# Patient Record
Sex: FEMALE | Race: WHITE | Employment: OTHER | URBAN - METROPOLITAN AREA
[De-identification: names, ages, dates, MRNs, and addresses within clinical notes are randomized per-mention and may not be internally consistent; named-entity substitution may affect disease eponyms.]

---

## 2017-07-17 PROBLEM — Z79.899 ENCOUNTER FOR MEDICATION MANAGEMENT: Status: ACTIVE | Noted: 2017-07-17

## 2017-07-17 PROBLEM — G43.119 INTRACTABLE MIGRAINE WITH AURA WITHOUT STATUS MIGRAINOSUS: Status: ACTIVE | Noted: 2017-07-17

## 2017-07-17 PROBLEM — R41.82 ALTERED MENTAL STATUS, UNSPECIFIED: Status: ACTIVE | Noted: 2017-07-17

## 2017-07-26 ENCOUNTER — HOSPITAL ENCOUNTER (OUTPATIENT)
Dept: MRI IMAGING | Age: 27
Discharge: HOME | End: 2017-07-26
Attending: PSYCHIATRY & NEUROLOGY
Payer: COMMERCIAL

## 2017-07-26 DIAGNOSIS — R41.82 ALTERED MENTAL STATUS, UNSPECIFIED: ICD-10-CM

## 2017-07-26 PROCEDURE — 70551 MRI BRAIN STEM W/O DYE: CPT

## 2017-10-18 PROBLEM — G43.119 INTRACTABLE MIGRAINE WITH AURA WITHOUT STATUS MIGRAINOSUS: Status: RESOLVED | Noted: 2017-07-17 | Resolved: 2017-10-18

## 2017-10-18 PROBLEM — G43.109 MIGRAINE WITH AURA AND WITHOUT STATUS MIGRAINOSUS, NOT INTRACTABLE: Status: ACTIVE | Noted: 2017-10-18

## 2017-10-21 PROBLEM — R90.89 ABNORMAL FINDING ON MRI OF BRAIN: Status: ACTIVE | Noted: 2017-10-21

## 2019-01-08 PROBLEM — G90.A POTS (POSTURAL ORTHOSTATIC TACHYCARDIA SYNDROME): Status: ACTIVE | Noted: 2019-01-08

## 2019-01-08 PROBLEM — R55 SYNCOPE: Status: ACTIVE | Noted: 2019-01-08

## 2021-03-08 PROBLEM — R90.89 ABNORMAL FINDING ON MRI OF BRAIN: Status: RESOLVED | Noted: 2017-10-21 | Resolved: 2021-03-08

## 2021-03-08 PROBLEM — R55 SYNCOPE: Status: RESOLVED | Noted: 2019-01-08 | Resolved: 2021-03-08

## 2021-03-08 PROBLEM — R41.82 ALTERED MENTAL STATUS, UNSPECIFIED: Status: RESOLVED | Noted: 2017-07-17 | Resolved: 2021-03-08

## 2021-03-08 PROBLEM — Z79.899 ENCOUNTER FOR MEDICATION MANAGEMENT: Status: RESOLVED | Noted: 2017-07-17 | Resolved: 2021-03-08

## 2021-03-08 PROBLEM — G90.A POTS (POSTURAL ORTHOSTATIC TACHYCARDIA SYNDROME): Status: RESOLVED | Noted: 2019-01-08 | Resolved: 2021-03-08

## 2022-03-19 PROBLEM — G90.A POTS (POSTURAL ORTHOSTATIC TACHYCARDIA SYNDROME): Status: ACTIVE | Noted: 2019-01-08

## 2022-03-19 PROBLEM — G43.109 MIGRAINE WITH AURA AND WITHOUT STATUS MIGRAINOSUS, NOT INTRACTABLE: Status: ACTIVE | Noted: 2017-10-18

## 2022-06-06 ENCOUNTER — TELEPHONE (OUTPATIENT)
Dept: CARDIOLOGY CLINIC | Age: 32
End: 2022-06-06

## 2022-06-06 NOTE — TELEPHONE ENCOUNTER
Pt called stating she was told Dr. Ritchie Alexander would send a referral to internal medicine of her choice. She is scheduled to see Dr. Georgia uKmari at [facility] and would like to have the referral sent to them.      [phone]

## 2022-06-07 NOTE — TELEPHONE ENCOUNTER
Called [facility], pt scheduled June 28th. They state that they do not need a referral for pt, and should be able to see records through epic.

## 2022-06-14 ENCOUNTER — OFFICE VISIT (OUTPATIENT)
Dept: CARDIOLOGY CLINIC | Age: 32
End: 2022-06-14
Payer: COMMERCIAL

## 2022-06-14 VITALS
HEIGHT: 65 IN | SYSTOLIC BLOOD PRESSURE: 110 MMHG | DIASTOLIC BLOOD PRESSURE: 64 MMHG | WEIGHT: 114.7 LBS | BODY MASS INDEX: 19.11 KG/M2 | HEART RATE: 72 BPM

## 2022-06-14 DIAGNOSIS — G90.A POTS (POSTURAL ORTHOSTATIC TACHYCARDIA SYNDROME): Primary | ICD-10-CM

## 2022-06-14 PROCEDURE — 93000 ELECTROCARDIOGRAM COMPLETE: CPT | Performed by: INTERNAL MEDICINE

## 2022-06-14 PROCEDURE — 99214 OFFICE O/P EST MOD 30 MIN: CPT | Performed by: INTERNAL MEDICINE

## 2022-06-14 ASSESSMENT — ENCOUNTER SYMPTOMS
ALLERGIC/IMMUNOLOGIC NEGATIVE: 1
GASTROINTESTINAL NEGATIVE: 1
EYES NEGATIVE: 1
RESPIRATORY NEGATIVE: 1

## 2022-06-14 NOTE — PROGRESS NOTES
[facility] CARDIOLOGY  [address] [facility], [address]  PHONE: [phone]        22    NAME:  Peter Bradley  : 1990  MRN: [id number]     Referring Physician: Dot Madsen MD, [place], [place]     Reason for Consultation: POTS       ASSESSMENT and PLAN:   Diagnoses and all orders for this visit:      1. Encounter to establish care with new doctor      2. Migraine with aura and without status migrainosus, not intractable      3. POTS (postural orthostatic tachycardia syndrome)      4. MVP      33year old female with a possible diagnosis of POTS which appears to be better controlled than she initially was in 2017. She has been coreg which has helped but she is also on digoxin which is not an ideal medicine for her given age and the potential  to cause further arrhythmia. For now, I would like to further evaluate for a potential SVT with a monitor as well as her response to exercise with a stress echo. We may need to consider a SRINIVASA in follow up for a formal evaluation for POTS. I encouraged  use of Gatorade especially during the warmer months. In follow up, doing much better. Identified food triggers which has helped.       -Echo stress and monitor, reviewed, stable. -Reviewed OSH records.    -Continue beta blocker, off digoxin.   -Routine PCP follow up.    -Increase salt intake and use of gatorade especially in summer. -EP follow up in 6 months or PRN. Patient has been instructed and agrees to call our office with any issues or other concerns related to their cardiac condition(s) and/or complaint(s). No follow-up provider specified. Thank you for allowing me to participate in the electrophysiologic care of Ms. Peter Bradley. Please contact me if any questions or concerns were to arise. Rosa Albrgiht.  Felicia ORR, 5 [facility] Cardiac Electrophysiology  [facility] Cardiology  22  10:53 AM    ===================================================================  Chief Complant:    Chief Complaint   Patient presents with    Follow-up     POTS           Consultation is requested by No ref. provider found for evaluation of Follow-up (POTS   )      History:  José Miguel Perez is a most pleasant 33 y.o. female with a past medical and cardiac history significant for palpitations. She comes in for followup. She has formerly been followed  by Dr. Benjy Slaughter in [facility]. Her workup included an echo which was significant for possible MVP by echo. She has been treated with metoprolol and atenolol which did not help her. She is now on coreg and digoxin. She has also been told she has  POTS. She does describe her HR going in the 160s in 2017 which woke her up from sleep. She was told to increase salt/gatorade at that time. She did have symptoms that included significant palpitations and difficulty standing which was a difficult experience  for her. Her symptoms are much more controlled these days but she still has events that occur at times. She otherwise is compliant with medicine therapy. Her daughter, Anson Li, joined her in the office today. She is 12 and is going into 7th grade. Her mother,  Caitlyn Miranda is going to see me in the office soon. She comes in follow up. She's generally been doing well. She has had a few presyncopal episodes, but she feels as though she's been doing well compared to previously. The patient otherwise denies chest pain, dyspnea, syncope or lateralizing symptoms. Cardiac PMH: (Old records have been reviewed and summarized below)      EKG:  (EKG has been independently visualized by me with interpretation below): NSR, normal axis, no ischemia. No evidence for pre-excitation,  QTc prolongation or Brugada pattern. Monitor: 8/2021, 14 days, WNL       ECHO: 10/2019, EF WNL, borderline LVH, MVP?  Mild MR, RVSP 19 mmHg       ECHO stress:  ·   Echo: Normal stress echocardiogram. Low risk study. 10.4 METs     Stress Findings        Stress ECG  Baseline ECG: Normal sinus rhythm, non-specific ST-T wave abnormalities. There were no arrhythmias during stress. There was no ST  segment deviation noted during stress. There were no arrhythmias during recovery. Stress Test  A stress test was performed following the Ned protocol. Increased incline only in Stage IV. The test was stopped because the patient  complained of fatigue and shortness of breath. The patient reported no angina during stress. Pt denied c/o chest pain. Blood pressure demonstrated a normal response to exercise. Angina Index is 0. The Duke Treadmill score is 9 (low  risk). Last dose of Coreg was 9/15/21 in the afternoon. Previous Heart Catheterization: n/a      Stress Test: 7/2020, low risk nuclear stress test.       DEVICE INTERROGATION: n/a     Past Medical History, Past Surgical History, Family history, Social History, and Medications were all reviewed with the patient today and updated as necessary. Current Outpatient Medications   Medication Sig Dispense Refill    Fluticasone-Salmeterol (ADVAIR DISKUS IN) Inhale into the lungs      carvedilol (COREG CR) 10 MG CP24 extended release capsule Take 10 mg by mouth daily (with breakfast)      Cetirizine HCl (ZYRTEC ALLERGY) 10 MG TBDP 1 tablet       No current facility-administered medications for this visit.      Allergies   Allergen Reactions    Amoxicillin-Pot Clavulanate Hives    Cephalexin Diarrhea    Codeine Anaphylaxis    Meperidine Anaphylaxis    Clindamycin Diarrhea    Gentamicin Diarrhea    Azithromycin Other (See Comments)       Social History     Tobacco Use    Smoking status: Never Smoker    Smokeless tobacco: Never Used   Substance Use Topics    Alcohol use: Not Currently       ROS:  A comprehensive review of systems was performed with the pertinent positives and negatives as noted in the HPI in addition to:  Review of Systems   Constitutional: Negative. HENT: Negative. Eyes: Negative. Respiratory: Negative. Cardiovascular: Negative. Gastrointestinal: Negative. Endocrine: Negative. Genitourinary: Negative. Musculoskeletal: Negative. Skin: Negative. Allergic/Immunologic: Negative. Neurological: Negative. Hematological: Negative. Psychiatric/Behavioral: Negative. All other systems reviewed and are negative. PHYSICAL EXAM:   /64   Pulse 72   Ht 5' 5\" (1.651 m)   Wt 114 lb 11.2 oz (52 kg) Comment: with shoes  BMI 19.09 kg/m²      Wt Readings from Last 3 Encounters:   06/14/22 114 lb 11.2 oz (52 kg)   12/14/21 118 lb (53.5 kg)   09/16/21 112 lb (50.8 kg)     BP Readings from Last 3 Encounters:   06/14/22 110/64   12/14/21 102/62   09/16/21 110/60       Gen: Well appearing, well developed, no acute distress  Eyes: Pupils equal, round. Extraocular movements are intact  ENT: Oropharynx clear, no oral lesions, normal dentition  CV: S1S2, regular rate and rhythm, no murmurs, rubs or gallops, normal JVD, no carotid bruits, normal distal pulses, no LETICIA  Pulm: Clear to auscultation bilaterally, no accessory muscle uses, no wheezes or rales  GI: Soft, NT, ND, +BS  Neuro: Alert and oriented, nonfocal  Psych: Appropriate affect  Skin: Normal color and skin turgor  MSK: Normal muscle bulk and tone    Medical problems and test results were reviewed with the patient today. No results found for any visits on 06/14/22.

## 2022-09-13 NOTE — TELEPHONE ENCOUNTER
MEDICATION REFILL REQUEST      Name of Medication:  Carvedilol  Dose:  10 mg  Frequency:  1 a day  Quantity:  90  Days' supply:  90      Pharmacy Name/Location:  no pharmacy info left on vm  Please call pt to see which pharmacy

## 2022-09-13 NOTE — TELEPHONE ENCOUNTER
Spoke to pt call in prescription to [facility] Prescriptions     Pending Prescriptions Disp Refills    carvedilol (COREG CR) 10 MG CP24 extended release capsule 90 capsule 3     Sig: Take 1 capsule by mouth daily (with breakfast)        Refill request sent to provider for approval

## 2022-09-14 ENCOUNTER — TELEPHONE (OUTPATIENT)
Dept: CARDIOLOGY CLINIC | Age: 32
End: 2022-09-14

## 2022-09-14 RX ORDER — CARVEDILOL PHOSPHATE 10 MG/1
10 CAPSULE, EXTENDED RELEASE ORAL
Qty: 90 CAPSULE | Refills: 3 | Status: SHIPPED | OUTPATIENT
Start: 2022-09-14

## 2022-09-14 NOTE — TELEPHONE ENCOUNTER
Patient called to check on prescription left yesterday Carvedilol 10mg.  Patient said she needs it to go to [address] [place] [address]

## 2022-12-27 ENCOUNTER — OFFICE VISIT (OUTPATIENT)
Dept: CARDIOLOGY CLINIC | Age: 32
End: 2022-12-27
Payer: COMMERCIAL

## 2022-12-27 VITALS
WEIGHT: 117 LBS | DIASTOLIC BLOOD PRESSURE: 64 MMHG | HEART RATE: 61 BPM | BODY MASS INDEX: 19.49 KG/M2 | SYSTOLIC BLOOD PRESSURE: 110 MMHG | HEIGHT: 65 IN

## 2022-12-27 DIAGNOSIS — G90.A POTS (POSTURAL ORTHOSTATIC TACHYCARDIA SYNDROME): Primary | ICD-10-CM

## 2022-12-27 PROCEDURE — 93000 ELECTROCARDIOGRAM COMPLETE: CPT | Performed by: INTERNAL MEDICINE

## 2022-12-27 PROCEDURE — 99214 OFFICE O/P EST MOD 30 MIN: CPT | Performed by: INTERNAL MEDICINE

## 2022-12-27 RX ORDER — TRETINOIN 0.5 MG/G
CREAM TOPICAL
COMMUNITY

## 2022-12-27 ASSESSMENT — ENCOUNTER SYMPTOMS
GASTROINTESTINAL NEGATIVE: 1
RESPIRATORY NEGATIVE: 1
ALLERGIC/IMMUNOLOGIC NEGATIVE: 1
EYES NEGATIVE: 1

## 2022-12-27 NOTE — PROGRESS NOTES
[facility] CARDIOLOGY  [address] [facility], [address]  Silvina Avilez, [address] [place] [address]  PHONE: [phone]        22    NAME:  Antolin Joya  : 1990  MRN: [id number]     Referring Physician: Leonel Mckeon MD, [place], [place]     Reason for Consultation: POTS       ASSESSMENT and PLAN:   Diagnoses and all orders for this visit:      1. Encounter to establish care with new doctor      2. Migraine with aura and without status migrainosus, not intractable      3. POTS (postural orthostatic tachycardia syndrome)      4. MVP      27year old female with a possible diagnosis of POTS which appears to be better controlled than she initially was in 2017. She has been coreg which has helped but she is also on digoxin which is not an ideal medicine for her given age and the potential  to cause further arrhythmia. For now, I would like to further evaluate for a potential SVT with a monitor as well as her response to exercise with a stress echo. We may need to consider a SRINIVASA in follow up for a formal evaluation for POTS. I encouraged use of Gatorade especially during the warmer months. In follow up, doing much better. Identified food triggers which has helped. This continues in today's evaluation. -POTS - Echo stress and monitor, reviewed, stable. -Reviewed OSH records.    -Continue beta blocker, off digoxin (feels much better after stopping this). -Routine PCP follow up.    -Increase salt intake and use of gatorade especially in summer. -EP follow up in 6 months or PRN. Patient has been instructed and agrees to call our office with any issues or other concerns related to their cardiac condition(s) and/or complaint(s). No follow-up provider specified. Thank you for allowing me to participate in the electrophysiologic care of Ms. Antolin Joya. Please contact me if any questions or concerns were to arise. Marcus Fajardo.  Felicia ORR, MS  Clinical Cardiac Electrophysiology  [facility] Cardiology  12/27/22  4:03 PM    ===================================================================  Chief Complant:    Chief Complaint   Patient presents with    Irregular Heart Beat    Other     POTS        Consultation is requested by Luciana Laboy MD for evaluation of Irregular Heart Beat and Other (POTS)      History:  Tony Pereira is a most pleasant 27 y.o. female with a past medical and cardiac history significant for palpitations. She comes in for followup. She has formerly been followed  by Dr. Collin Perry in [facility]. Her workup included an echo which was significant for possible MVP by echo. She has been treated with metoprolol and atenolol which did not help her. She is now on coreg and digoxin. She has also been told she has  POTS. She does describe her HR going in the 160s in 2017 which woke her up from sleep. She was told to increase salt/gatorade at that time. She did have symptoms that included significant palpitations and difficulty standing which was a difficult experience  for her. Her symptoms are much more controlled these days but she still has events that occur at times. She otherwise is compliant with medicine therapy. Her daughter, Andrés Hernández, joined her in the office today. She is 12 and is going into 7th grade. Her mother,  Mike Hitchcock is going to see me in the office soon. She comes in follow up. She's generally been doing well. She reports one episode where her heart rate increased to the 140s-150s after eating a meal but doing well since then. The patient otherwise denies chest pain, dyspnea, syncope or lateralizing symptoms. Cardiac PMH: (Old records have been reviewed and summarized below)      EKG:  (EKG has been independently visualized by me with interpretation below): NSR, normal axis, no ischemia. No evidence for pre-excitation,  QTc prolongation or Brugada pattern. Monitor: 8/2021, 14 days, WNL       ECHO: 10/2019, EF WNL, borderline LVH, MVP? Mild MR, RVSP 19 mmHg       ECHO stress:    Echo: Normal stress echocardiogram. Low risk study. 10.4 METs     Stress Findings        Stress ECG  Baseline ECG: Normal sinus rhythm, non-specific ST-T wave abnormalities. There were no arrhythmias during stress. There was no ST  segment deviation noted during stress. There were no arrhythmias during recovery. Stress Test  A stress test was performed following the Ned protocol. Increased incline only in Stage IV. The test was stopped because the patient  complained of fatigue and shortness of breath. The patient reported no angina during stress. Pt denied c/o chest pain. Blood pressure demonstrated a normal response to exercise. Angina Index is 0. The Duke Treadmill score is 9 (low  risk). Last dose of Coreg was 9/15/21 in the afternoon. Previous Heart Catheterization: n/a      Stress Test: 7/2020, low risk nuclear stress test.       DEVICE INTERROGATION: n/a     Past Medical History, Past Surgical History, Family history, Social History, and Medications were all reviewed with the patient today and updated as necessary. Current Outpatient Medications   Medication Sig Dispense Refill    tretinoin (RETIN-A) 5.93 % cream 1 application in the evening to face      Multiple Vitamin (MULTI-VITAMIN DAILY PO) Take by mouth      carvedilol (COREG CR) 10 MG CP24 extended release capsule Take 1 capsule by mouth daily (with breakfast) 90 capsule 3     No current facility-administered medications for this visit. Allergies   Allergen Reactions    Amoxicillin-Pot Clavulanate Hives     Other reaction(s): hives, Hives/Swelling-Allergy, Tachycardia-Intolerance    Cephalexin Diarrhea     Other reaction(s):  Other (See Comments), Other (See Comments), Unknown  colitis  colitis      Codeine Anaphylaxis and Shortness Of Breath     Other reaction(s): Unknown    Doxycycline      Other reaction(s): Headache-Intolerance    Meperidine Anaphylaxis and Shortness Of Breath    Meperidine Hcl      Other reaction(s): Unknown    Clindamycin Diarrhea and Nausea And Vomiting     Other reaction(s): GI intolerance, Nausea and/or vomiting-Intolerance, Other (See Comments), Other (See Comments)  colitis  colitis      Gentamicin Diarrhea    Hydrocodone-Acetaminophen      Other reaction(s): Other (See Comments)    Methylergonovine      Other reaction(s): Other (See Comments)    Azithromycin Other (See Comments) and Palpitations     Other reaction(s): Tachycardia-Intolerance       Social History     Tobacco Use    Smoking status: Never    Smokeless tobacco: Never   Substance Use Topics    Alcohol use: Not Currently       ROS:  A comprehensive review of systems was performed with the pertinent positives and negatives as noted in the HPI in addition to:  Review of Systems   Constitutional: Negative. HENT: Negative. Eyes: Negative. Respiratory: Negative. Cardiovascular: Negative. Gastrointestinal: Negative. Endocrine: Negative. Genitourinary: Negative. Musculoskeletal: Negative. Skin: Negative. Allergic/Immunologic: Negative. Neurological: Negative. Hematological: Negative. Psychiatric/Behavioral: Negative. All other systems reviewed and are negative. PHYSICAL EXAM:   /64   Pulse 61   Ht 5' 5\" (1.651 m)   Wt 117 lb (53.1 kg)   BMI 19.47 kg/m²      Wt Readings from Last 3 Encounters:   12/27/22 117 lb (53.1 kg)   06/14/22 114 lb 11.2 oz (52 kg)   12/14/21 118 lb (53.5 kg)     BP Readings from Last 3 Encounters:   12/27/22 110/64   06/14/22 110/64   12/14/21 102/62     Gen: Well appearing, well developed, no acute distress  Eyes: Pupils equal, round.  Extraocular movements are intact  ENT: Oropharynx clear, no oral lesions, normal dentition  CV: S1S2, regular rate and rhythm, no murmurs, rubs or gallops, normal JVD, no carotid bruits, normal distal pulses, no LETICIA  Pulm: Clear to auscultation bilaterally, no accessory muscle uses, no wheezes or rales  GI: Soft, NT, ND, +BS  Neuro: Alert and oriented, nonfocal  Psych: Appropriate affect  Skin: Normal color and skin turgor  MSK: Normal muscle bulk and tone    Medical problems and test results were reviewed with the patient today. No results found for any visits on 12/27/22.

## 2023-06-23 ENCOUNTER — OFFICE VISIT (OUTPATIENT)
Age: 33
End: 2023-06-23
Payer: COMMERCIAL

## 2023-06-23 VITALS
SYSTOLIC BLOOD PRESSURE: 110 MMHG | DIASTOLIC BLOOD PRESSURE: 62 MMHG | BODY MASS INDEX: 18.99 KG/M2 | WEIGHT: 114 LBS | HEIGHT: 65 IN | HEART RATE: 66 BPM

## 2023-06-23 DIAGNOSIS — G90.A POTS (POSTURAL ORTHOSTATIC TACHYCARDIA SYNDROME): Primary | ICD-10-CM

## 2023-06-23 PROCEDURE — 99214 OFFICE O/P EST MOD 30 MIN: CPT | Performed by: INTERNAL MEDICINE

## 2023-06-23 PROCEDURE — 93000 ELECTROCARDIOGRAM COMPLETE: CPT | Performed by: INTERNAL MEDICINE

## 2023-06-23 RX ORDER — CARVEDILOL PHOSPHATE 10 MG/1
10 CAPSULE, EXTENDED RELEASE ORAL
Qty: 90 CAPSULE | Refills: 3 | Status: SHIPPED | OUTPATIENT
Start: 2023-06-23

## 2023-06-23 NOTE — PROGRESS NOTES
accessory muscle uses, no wheezes or rales  GI: Soft, NT, ND, +BS  Neuro: Alert and oriented, nonfocal  Psych: Appropriate affect  Skin: Normal color and skin turgor  MSK: Normal muscle bulk and tone    Medical problems and test results were reviewed with the patient today. No results found for any visits on 06/23/23.

## 2023-07-03 ENCOUNTER — TELEPHONE (OUTPATIENT)
Age: 33
End: 2023-07-03

## 2023-07-03 DIAGNOSIS — I49.8 OTHER SPECIFIED CARDIAC ARRHYTHMIAS: Primary | ICD-10-CM

## 2023-07-03 NOTE — TELEPHONE ENCOUNTER
Pt states Dr. Bryanna Oconnor told her that she doesn't have mitral valve issues but another doctor told her that she does. Pt would like to have an echo performed to rule out issues with mitral valve.

## 2023-08-03 ENCOUNTER — TELEPHONE (OUTPATIENT)
Age: 33
End: 2023-08-03

## 2023-08-03 NOTE — TELEPHONE ENCOUNTER
Pt states that she went to urgent care and hey said she has a UTI that is strep group B and that they call her in some Nitrofurantoin and it caused her to have heart palpitations. So they prescribed her fosfomycin one time power. Wants to know if that's ok to take. Also she has had colitis using keflex and clindamycin Wants to know what she needs to do.

## 2023-08-03 NOTE — TELEPHONE ENCOUNTER
Spoke with pt made her aware per Dr Pattie Gunderson since she is allergic to most meds so she'll have to probably deal with some palps until she gets it treated. Pt verbalized understanding.

## 2023-11-15 ENCOUNTER — TELEPHONE (OUTPATIENT)
Age: 33
End: 2023-11-15

## 2023-11-15 NOTE — TELEPHONE ENCOUNTER
Pt states her BP over the past couple of days, especially last night, HR has been low. States HR last night was 58 before she took her carvedilol 10 mg. States waited until MN to take her carvedilol. States BP has also been running low as well. 90s-low 100s/50s-60s. Asking if carvedilol dose needs to be adjusted. Currently taking 10 mg CR at HS. Pt made aware MD is at the hospital today. Would hopefully hear back from this RN by the end of the day. ER precautions given for severe sx. Verb understanding.

## 2023-11-15 NOTE — TELEPHONE ENCOUNTER
Patient called stating she is experiencing the following issues :    HQ=485/60, 90/50 sporadically  HR= 50's  Pt states possible POTS  Pt takes carvedilol (COREG CR) 10 MG CP24 extended release capsule and states this exacerbates the low readings but also keeps them from going too high. Pt states she's inquiring on changing dosages?

## 2023-11-15 NOTE — TELEPHONE ENCOUNTER
MD Leroy Murguia, RN  Caller: Unspecified (Today, 10:31 AM)  She can hold if she has symptoms of weakness/lightheadedness but I'm not terribly concerned. Reviewed Dr. Melodie Clarke recommendations with pt. Verb understanding.

## 2024-01-08 ENCOUNTER — TELEPHONE (OUTPATIENT)
Age: 34
End: 2024-01-08

## 2024-01-08 NOTE — TELEPHONE ENCOUNTER
Carvedilol needs a PA  and patient also left message stating she has left messages for someone to call her to make apt w/ .

## 2024-01-08 NOTE — TELEPHONE ENCOUNTER
Spoke with pt. Made her aware per the pharmacy no PA is needed there was an issue with quantitiy but they were able to get prescription approved. They made pt aware it will not be in stock until tomorrow. Pt verbalized understanding. She stated she also spoke with the  whom made her aware she is not due for a follow up until June and they will follow up with her when the schedule is available to discuss follow up options.

## 2024-01-23 ENCOUNTER — TELEPHONE (OUTPATIENT)
Age: 34
End: 2024-01-23

## 2024-01-23 NOTE — TELEPHONE ENCOUNTER
Patient called stating that a PA for Carvedilol. Please call patient to let her know this has been started because the insurance letter stated that it needed to be done within 2 weeks

## 2024-01-23 NOTE — TELEPHONE ENCOUNTER
Spoke with pt. She stated her insurance stated beginning in February because of the type of Carvedilol she takes it will require a prior auth. Made pt aware the pharmacy is unable to process this medication until her refill is due and if at that time a prior auth is needed they will submit paperwork to us.

## 2024-02-19 ENCOUNTER — TELEPHONE (OUTPATIENT)
Age: 34
End: 2024-02-19

## 2024-02-19 NOTE — TELEPHONE ENCOUNTER
Carvedilol PA submitted via [employer] Med[employer]. [facility]

## 2024-02-19 NOTE — TELEPHONE ENCOUNTER
Spoke with pt made her aware we reached out to the pharmacy and they stated a prior auth request is not being created. Pt states she spoke with the authorization team and they made her aware a prior auth would need to be submitted for the month of March. Pt stated she is going to send the letter she received from her insurance company and we will send it to our prior auth team. Pt verbalized understanding.

## 2024-02-20 NOTE — TELEPHONE ENCOUNTER
Received paperwork from insurance company stating pts PA is approved. Placed call to pt made her aware.

## 2024-05-20 RX ORDER — CARVEDILOL PHOSPHATE 10 MG/1
10 CAPSULE, EXTENDED RELEASE ORAL
Qty: 90 CAPSULE | Refills: 3 | Status: SHIPPED | OUTPATIENT
Start: 2024-05-20

## 2024-05-20 NOTE — TELEPHONE ENCOUNTER
Requested Prescriptions     Pending Prescriptions Disp Refills    carvedilol (COREG CR) 10 MG CP24 extended release capsule 90 capsule 3     Sig: Take 1 capsule by mouth daily (with breakfast)

## 2024-05-20 NOTE — TELEPHONE ENCOUNTER
MEDICATION REFILL REQUEST      Name of Medication:  carvedilol extended release    Dose:  10 mg  Frequency:  daily   Quantity:    Days' supply:  90      Pharmacy Name[facility]Location:  [facility]/ Please call in today because she is completely out

## 2024-06-24 ENCOUNTER — OFFICE VISIT (OUTPATIENT)
Age: 34
End: 2024-06-24
Payer: COMMERCIAL

## 2024-06-24 VITALS
WEIGHT: 114 LBS | BODY MASS INDEX: 18.32 KG/M2 | HEIGHT: 66 IN | DIASTOLIC BLOOD PRESSURE: 62 MMHG | SYSTOLIC BLOOD PRESSURE: 100 MMHG

## 2024-06-24 DIAGNOSIS — I49.8 OTHER SPECIFIED CARDIAC ARRHYTHMIAS: ICD-10-CM

## 2024-06-24 DIAGNOSIS — G90.A POTS (POSTURAL ORTHOSTATIC TACHYCARDIA SYNDROME): Primary | ICD-10-CM

## 2024-06-24 PROCEDURE — 93000 ELECTROCARDIOGRAM COMPLETE: CPT | Performed by: INTERNAL MEDICINE

## 2024-06-24 PROCEDURE — 99214 OFFICE O/P EST MOD 30 MIN: CPT | Performed by: INTERNAL MEDICINE

## 2024-06-24 RX ORDER — FAMOTIDINE 40 MG/1
40 TABLET, FILM COATED ORAL PRN
COMMUNITY
Start: 2023-12-06

## 2024-06-24 ASSESSMENT — ENCOUNTER SYMPTOMS
ALLERGIC/IMMUNOLOGIC NEGATIVE: 1
EYES NEGATIVE: 1
RESPIRATORY NEGATIVE: 1
GASTROINTESTINAL NEGATIVE: 1

## 2024-06-24 NOTE — PROGRESS NOTES
Electrophysiology  [facility] Cardiology  06/24/24  9:21 AM    ===================================================================  Chief Complant:    Chief Complaint   Patient presents with    POTS    Follow-up      History:  Tammi Aden is a most pleasant 33 y.o. female with a past medical and cardiac history significant for palpitations. She comes in for followup. She has formerly been followed  by Dr. Hairston in [place], [place]. Her workup included an echo which was significant for possible MVP by echo. She has been treated with metoprolol and atenolol which did not help her. She is now on coreg and digoxin. She has also been told she has  POTS. She does describe her HR going in the 160s in 2017 which woke her up from sleep. She was told to increase salt/gatorade at that time. She did have symptoms that included significant palpitations and difficulty standing which was a difficult experience  for her. Her symptoms are much more controlled these days but she still has events that occur at times. She otherwise is compliant with medicine therapy. Her daughter, Lily, joined her in the office today. She is 12 and is going into 7th grade. Her mother, Padma Felipe, is going to see me in the office soon.      She comes in follow up. She's generally been doing well. She reports feeling some palps especially with upper body workouts, but generally doing very well. The patient otherwise denies chest pain, dyspnea, syncope or lateralizing symptoms.      Cardiac PMH: (Old records have been reviewed and summarized below)      EKG:  (EKG has been independently visualized by me with interpretation below): NSR, normal axis, no ischemia. No evidence for pre-excitation,  QTc prolongation or Brugada pattern.      Monitor: 8/2021, 14 days, WNL       ECHO: 10/2019, EF WNL, borderline LVH, MVP? Mild MR, RVSP 19 mmHg       ECHO stress:    Echo: Normal stress echocardiogram. Low risk study. 10.4 METs     Stress Findings

## 2025-02-04 RX ORDER — CARVEDILOL PHOSPHATE 10 MG/1
CAPSULE, EXTENDED RELEASE ORAL
Qty: 180 CAPSULE | Refills: 1 | Status: SHIPPED | OUTPATIENT
Start: 2025-02-04

## 2025-02-04 NOTE — TELEPHONE ENCOUNTER
Requested Prescriptions     Pending Prescriptions Disp Refills    carvedilol (COREG CR) 10 MG CP24 extended release capsule [Pharmacy Med Name: CARVEDILOL ER 10 MG CAPSULE] 180 capsule 1     Sig: TAKE 1 CAPSULE BY MOUTH EVERY DAY WITH BREAKFAST    Verified rx in last OV date 6/24/24. Pharmacy confirmed. Erx as requested

## 2025-05-27 ENCOUNTER — OFFICE VISIT (OUTPATIENT)
Age: 35
End: 2025-05-27
Payer: COMMERCIAL

## 2025-05-27 VITALS
WEIGHT: 115 LBS | HEIGHT: 65 IN | DIASTOLIC BLOOD PRESSURE: 66 MMHG | HEART RATE: 68 BPM | BODY MASS INDEX: 19.16 KG/M2 | SYSTOLIC BLOOD PRESSURE: 112 MMHG

## 2025-05-27 DIAGNOSIS — G90.A POTS (POSTURAL ORTHOSTATIC TACHYCARDIA SYNDROME): Primary | ICD-10-CM

## 2025-05-27 PROCEDURE — 99214 OFFICE O/P EST MOD 30 MIN: CPT | Performed by: INTERNAL MEDICINE

## 2025-05-27 PROCEDURE — 93000 ELECTROCARDIOGRAM COMPLETE: CPT | Performed by: INTERNAL MEDICINE

## 2025-05-27 RX ORDER — CARVEDILOL PHOSPHATE 10 MG/1
10 CAPSULE, EXTENDED RELEASE ORAL
Qty: 180 CAPSULE | Refills: 3 | Status: SHIPPED | OUTPATIENT
Start: 2025-05-27

## 2025-05-27 ASSESSMENT — ENCOUNTER SYMPTOMS
EYES NEGATIVE: 1
ALLERGIC/IMMUNOLOGIC NEGATIVE: 1
RESPIRATORY NEGATIVE: 1
GASTROINTESTINAL NEGATIVE: 1

## 2025-05-27 NOTE — PROGRESS NOTES
reaction(s): Other (See Comments), Other (See Comments), Unknown  colitis  colitis      Codeine Anaphylaxis and Shortness Of Breath     Other reaction(s): Unknown    Doxycycline      Other reaction(s): Headache-Intolerance    Meperidine Anaphylaxis and Shortness Of Breath    Meperidine Hcl      Other reaction(s): Unknown    Clindamycin Diarrhea and Nausea And Vomiting     Other reaction(s): GI intolerance, Nausea and/or vomiting-Intolerance, Other (See Comments), Other (See Comments)  colitis  colitis      Gentamicin Diarrhea    Amoxicillin Hives    Celery Oil Other (See Comments)    Hydrocodone-Acetaminophen      Other reaction(s): Other (See Comments)    Methylergonovine      Other reaction(s): Other (See Comments)    Nitrofurantoin      Other Reaction(s): HA    headache    Azithromycin Other (See Comments) and Palpitations     Other reaction(s): Tachycardia-Intolerance       Social History     Tobacco Use    Smoking status: Never     Passive exposure: Never    Smokeless tobacco: Never   Substance Use Topics    Alcohol use: Not Currently       ROS:  A comprehensive review of systems was performed with the pertinent positives and negatives as noted in the HPI in addition to:  Review of Systems   Constitutional: Negative.    HENT: Negative.     Eyes: Negative.    Respiratory: Negative.     Cardiovascular: Negative.    Gastrointestinal: Negative.    Endocrine: Negative.    Genitourinary: Negative.    Musculoskeletal: Negative.    Skin: Negative.    Allergic/Immunologic: Negative.    Neurological: Negative.    Hematological: Negative.    Psychiatric/Behavioral: Negative.     All other systems reviewed and are negative.    PHYSICAL EXAM:   /66   Pulse 68   Ht 1.651 m (5' 5\")   Wt 52.2 kg (115 lb)   BMI 19.14 kg/m²      Wt Readings from Last 3 Encounters:   05/27/25 52.2 kg (115 lb)   06/24/24 51.7 kg (114 lb)   08/03/23 51.7 kg (114 lb)     BP Readings from Last 3 Encounters:   05/27/25 112/66   06/24/24